# Patient Record
(demographics unavailable — no encounter records)

---

## 2025-07-08 NOTE — HISTORY OF PRESENT ILLNESS
[de-identified] : Malaika Venegas Romeo Cardenas 58 y/o F presents for spot check, on rt arm, 2 years pt states it gets itchy and she picks at lesion. #discoloration, rt thigh, present for 2 years. pt states this area gets red and hot 1-2 times a year--not currently inflamed.   Lists the following concerns:  Personal history of skin cancer: no Family history of skin cancer: no History of blistering sunburns: no History of tanning bed use: no Use of sunscreen regularly: no   [FreeTextEntry1] : NPA- spot check, discoloration

## 2025-07-08 NOTE — ASSESSMENT
[FreeTextEntry1] : #PIPA - coin shaped area on the right anterior thigh with slight pinkness but not other skin surface changes - like PIE from previous inflammation - advised pt to call if the area becomes inflamed again and RTC for in person evaluation  #Photoaging/Dermatoheliosis  - Use an adequate amount (1.5 ounces for entire body) of sunscreen >30-SPF  - Reapply sunscreen every 2 hours or immediately after swimming or excessive sweating.  - Seek the shade, especially between 10 am and 4pm  - Use UPF clothing   #Inflamed SK(s) - picked at/bleeding stuck on tan/brown papule(s) on the right forearm -  after VCO LN2 x 1, WCD  #Seborrheic keratoses - stuck on tan, waxy papules - B9, reassurance - Lesions can be treated with LN2 for cosmetic reasons but there is a risk of hypopigmentation/dyspigmentation  #Nevi of Trunk - normal reticular patterns on dermoscopy - currently B9 in appearance, reassurance, CTM  #Cherry angiomas - dome shaped red papules on trunk - B9, reassurance - Lesions can be treated with hyfrecation or laser for cosmetic reasons